# Patient Record
Sex: MALE | Race: BLACK OR AFRICAN AMERICAN | NOT HISPANIC OR LATINO | Employment: UNEMPLOYED | URBAN - METROPOLITAN AREA
[De-identification: names, ages, dates, MRNs, and addresses within clinical notes are randomized per-mention and may not be internally consistent; named-entity substitution may affect disease eponyms.]

---

## 2024-08-21 ENCOUNTER — HOSPITAL ENCOUNTER (EMERGENCY)
Facility: CLINIC | Age: 19
Discharge: HOME | End: 2024-08-21
Attending: EMERGENCY MEDICINE | Admitting: EMERGENCY MEDICINE
Payer: COMMERCIAL

## 2024-08-21 ENCOUNTER — APPOINTMENT (OUTPATIENT)
Dept: GENERAL RADIOLOGY | Facility: CLINIC | Age: 19
End: 2024-08-21
Attending: EMERGENCY MEDICINE

## 2024-08-21 VITALS
DIASTOLIC BLOOD PRESSURE: 82 MMHG | WEIGHT: 148 LBS | OXYGEN SATURATION: 98 % | RESPIRATION RATE: 18 BRPM | SYSTOLIC BLOOD PRESSURE: 120 MMHG | HEART RATE: 82 BPM | TEMPERATURE: 97.5 F
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
## 2024-08-21 DIAGNOSIS — S20.212A CHEST WALL CONTUSION, LEFT, INITIAL ENCOUNTER: ICD-10-CM

## 2024-08-21 PROCEDURE — 99283 EMERGENCY DEPT VISIT LOW MDM: CPT | Mod: 25

## 2024-08-21 PROCEDURE — 71046 X-RAY EXAM CHEST 2 VIEWS: CPT

## 2024-08-21 ASSESSMENT — COLUMBIA-SUICIDE SEVERITY RATING SCALE - C-SSRS
2. HAVE YOU ACTUALLY HAD ANY THOUGHTS OF KILLING YOURSELF IN THE PAST MONTH?: NO
1. IN THE PAST MONTH, HAVE YOU WISHED YOU WERE DEAD OR WISHED YOU COULD GO TO SLEEP AND NOT WAKE UP?: NO
6. HAVE YOU EVER DONE ANYTHING, STARTED TO DO ANYTHING, OR PREPARED TO DO ANYTHING TO END YOUR LIFE?: NO

## 2024-08-21 ASSESSMENT — ACTIVITIES OF DAILY LIVING (ADL)
ADLS_ACUITY_SCORE: 35
ADLS_ACUITY_SCORE: 35
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
## 2024-12-25 ENCOUNTER — HOSPITAL ENCOUNTER (EMERGENCY)
Facility: CLINIC | Age: 19
Discharge: HOME | End: 2024-12-25
Attending: PHYSICIAN ASSISTANT
Payer: COMMERCIAL

## 2024-12-25 ENCOUNTER — APPOINTMENT (OUTPATIENT)
Dept: GENERAL RADIOLOGY | Facility: CLINIC | Age: 19
End: 2024-12-25
Attending: PHYSICIAN ASSISTANT
Payer: COMMERCIAL

## 2024-12-25 VITALS
SYSTOLIC BLOOD PRESSURE: 128 MMHG | RESPIRATION RATE: 14 BRPM | DIASTOLIC BLOOD PRESSURE: 68 MMHG | HEART RATE: 83 BPM | OXYGEN SATURATION: 96 % | TEMPERATURE: 98 F

## 2024-12-25 DIAGNOSIS — S30.0XXA CONTUSION OF LOWER BACK, INITIAL ENCOUNTER: ICD-10-CM

## 2024-12-25 PROCEDURE — 99284 EMERGENCY DEPT VISIT MOD MDM: CPT

## 2024-12-25 PROCEDURE — 72100 X-RAY EXAM L-S SPINE 2/3 VWS: CPT

## 2024-12-25 RX ORDER — LIDOCAINE 4 G/G
1 PATCH TOPICAL EVERY 24 HOURS
Qty: 5 PATCH | Refills: 0 | Status: SHIPPED | OUTPATIENT
Start: 2024-12-25

## 2024-12-25 RX ORDER — CYCLOBENZAPRINE HCL 5 MG
10 TABLET ORAL 2 TIMES DAILY PRN
Qty: 15 TABLET | Refills: 0 | Status: SHIPPED | OUTPATIENT
Start: 2024-12-25

## 2024-12-25 ASSESSMENT — ACTIVITIES OF DAILY LIVING (ADL)
ADLS_ACUITY_SCORE: 41
ADLS_ACUITY_SCORE: 41

## 2024-12-25 NOTE — ED PROVIDER NOTES
Emergency Department Note      History of Present Illness     Chief Complaint   Back Pain      HPI   Sarah Turner is a 19 year old male presenting to the ED for the evaluation of back pain. The patient states that he was doing pull-ups on Friday (12/20/24) when he fell backward, landing on low back from a few feet up first on a metal platform and then the ground. Since then, he has been experiencing right midline and lower back pain. Sarah denies any urinary or bowel incontinence or numbness or tingling in the groin, legs or rectal area. Still able to ambulate.  Did not hit head or neck.  No blood in urine or abdominal pain or shortness of breath.  He also denies taking any analgesics today.    Independent Historian   None    Review of External Notes   I reviewed [facility]. [facility] family medicine note from 11/26/2024 with the patient was seen for preventative care I note history of anxiety and depression no history of bone issues blood thinner use or bleeding disorders.    Past Medical History     Medical History and Problem List   No past medical history on file.    Medications   Sertraline    Physical Exam     Patient Vitals for the past 24 hrs:   BP Temp Pulse Resp SpO2   12/25/24 1543 128/68 98  F (36.7  C) 83 14 96 %     Physical Exam  General: Awake, alert, non-toxic. Ambulatory, no distress  Head:  Scalp is NC/AT  Eyes:  Conjunctiva normal  ENT:  The external nose and ears are normal.   Neck:  Normal range of motion without rigidity.  Resp:  Breath sounds are clear bilaterally    Non-labored, no retractions or accessory muscle use  Abdomen: Abdomen is soft, no distension, no tenderness, no masses, no CVA ttp  MS:  No lower extremity edema/swelling. There is lumbar midline spinal ttp to multiple pts without stepoff as well as right lumbar paraspinal ttp and reproducible pain w/positional change.  No midline cervical, thoracic tenderness to palpation..  No joint swelling, redness ranging  well.  Skin:  Warm and dry, No rash or lesions noted.  Neuro:  Alert and oriented. GCS 15. No facial asymmetry. Moves upper extremities normally.  Gait normal    5/5 strength BL to hip flexion/extension    5/5 strength BL to knee flexion/extension    5/5 strength BL to ankle dorsi/plantar flexion    5/5 strength BL to great toe dorsi/plantar flexion    Normal sensation touch throughout BL LE.  Psych: Awake. Normal affect.  Cooperative.      Diagnostics       Imaging   Lumbar spine XR, 2-3 views   Final Result   IMPRESSION: No fracture. Normal vertebral heights and alignment. Normal disc spaces and facets for age. Normal extraspinal structures.        Independent Interpretation   X-ray lumbar spine shows no fracture or height loss.  No suspicious bony lesions.    ED Course      Medications Administered   Medications - No data to display    Procedures   Procedures     Discussion of Management   None    ED Course   ED Course as of 12/25/24 1706   Wed Dec 25, 2024   1545 I obtained the history and examined the patient as noted above.      1644 I went to recheck the patient but they could not be found at this time.     1704 The patient has eloped upon my recheck to discharge him.       Additional Documentation  None    Medical Decision Making / Diagnosis     CMS Diagnoses: None    MIPS       None    MDM   This patient presented with low back pain after minor fall 2 days ago.  Patient is well appearing with normal vitals.  X-ray of the lumbar spine shows no evidence of fracture height loss or gross misalignment.  Neurologic exam normal.  No step-offs or other red flag findings I do not think advanced imaging with CT or MRI is warranted.  Nothing to suggest more serious retroperitoneal or intra-abdominal injury head to toe trauma exam otherwise nonconcerning.  Consistent with contusion/strain of low back.      Patient was not able to be located on several attempts and appears to have left from the lobby prior to discussion  of results, return precautions, discharge medications.  His workup and examination here are negative and no findings of concern to notify him.    Addendum: The patient was subsequently able to be located and had return from being outside.  Discussed reassuring x-ray.  We will do muscle relaxer and Lidoderm patches for home.  Return precautions given for neurologic changes loss of feeling in groin rectal area or legs, weakness, inability to ambulate, uncontrolled pain or other new or worsening symptoms or concerns.  Disposition   Discharged    Diagnosis     ICD-10-CM    1. Contusion of lower back, initial encounter  S30.0XXA            Discharge Medications   New Prescriptions    No medications on file         Scribe Disclosure:  Winnie DESOUZA, am serving as a scribe at 3:51 PM on 12/25/2024 to document services personally performed by Marquise Webb PA-C based on my observations and the provider's statements to me.        Marquise Webb PA-C  12/25/24 1706       Marquise Webb PA-C  12/25/24 172